# Patient Record
Sex: FEMALE | Race: WHITE | NOT HISPANIC OR LATINO | URBAN - METROPOLITAN AREA
[De-identification: names, ages, dates, MRNs, and addresses within clinical notes are randomized per-mention and may not be internally consistent; named-entity substitution may affect disease eponyms.]

---

## 2023-05-11 NOTE — PATIENT INSTRUCTIONS
Please message us via My Chart with any questions or concerns.      Weather related headaches:  -Consider cyproheptadine 4 mg quarter of a pill or half a pill at bedtime as needed. May increase up as tolerated.   - Patient may consider take Benadryl liquid, about 5 mL (12.5 mg) or 2.5 mL (6.25 mg) may increase to higher dose as tolerated, to see if this helps with weather related headaches.  She is to take the Benadryl at nighttime before going to bed.  Only on the days when she noticed significant change in the barometric pressure.  Chronic migraine headaches without aura  Migraine headache with aura:  Preventive therapy for headaches:   - Continue Magnesium Oxide 400mg or magnesium glycinate 400 one at bedtime.  If any diarrhea or upset stomach, decrease dose as tolerated  - Vitamin B 2 (riboflavin) 400 mg once a day. Two minor adverse reactions reported: diarrhea and increased urine frequency.  May cause the urine to turn yellow which is normal for B 2 to do and is not a sign that you are dehydrated. May order on line, as it comes in 400 mg capsules.  - Continue Topamax  - Has been getting Botox her last Botox was due on May 8.  We will resubmit for Botox to start treatment here.  - Qulipta 60 mg at bedtime nightly  - Also consider verapamil 40 mg twice a day, on the weeks the patient has more headaches.  Abortive therapy for headaches:   - At the onset of headache: Ubrelvy 100 mg  If that fails-in 2 to 3 hours  - Next step: Take sucralfate 1 g half an hour before taking indomethacin 50 mg.  Or she can eat something and then take indomethacin.  If that fails- if that fails in 2 to 3 hours  -Next step: May repeat Ubrelvy 100 mg    Headache management instructions  - When patient has a moderate to severe headache, they should seek rest, initiate relaxation and apply cold compresses to the head.   - Maintain regular sleep schedule. Adults need at least 7-8 hours of uninterrupted sleep, per night.   - Limit over  "the counter medications such as Tylenol, Ibuprofen, Aleve, Excedrin. (No more than 2- 3 times a week or max 10 a month).  - Maintain headache diary.  Free AYUSH for a smart phone, which can be used is \"Migraine gerardo\"  - Limit caffeine to 1-2 cups, 8 to 16 oz a day or less.  - Avoid dietary trigger. (aged cheese, peanuts, MSG, aspartame and nitrates).  - Patient is to have regular frequent meals to prevent headache onset.    - Please drink at least 64 ounces of water a day, to help remain hydrated.    Cognitive behavioral therapy (CBT):  - Is a common type of talk therapy (psychotherapy) were you work with a psychotherapist or therapist . CBT helps you become aware of inaccurate or negative thinking so you can view challenging situations more clearly and respond to them in a more effective way. CBT can be a very helpful tool either alone or in combination with other therapies in treating mental health disorders and chronic pain. CBT can be an effective tool to help anyone learn how to better manage stressful life situations and pain. In some cases, CBT is most effective when it's combined with other treatments, such as antidepressants or other medications.  You can start yourself by down loading the ayush: Curable    Here are a few websites on which you can find certified cognitive behavioral therapists:  Academyofct.org (Academy of cognitive therapists)  Adaa.org (Anxiety and depression association of [place])  Abct.org (associated for behavioral and cognitive therapy)    Mindfulness/Meditation:  - Many people believe that stress is a major trigger for their pain. This is where mindfulness and meditation can come into play, as they have been known to help reduce migraine severity, duration and acute pain medication use. It may also help to relieve stress and anxiety while improving feelings of well being.    Biofeedback:   - Involves becoming more aware of the changes that occur in the body and learning how to exert " control over generally involuntary functions. Biofeedback allows you to see your vitals in real-time and learn how to stabilize them on your own. There is great evidence that biofeedback can reduce the frequency, intensity, and duration of pain.   When you're stressed, you may notice elevated heart rates, tightened muscles, and sweating.  During biofeedback, you can see these changes on a monitor, then a therapist teaches you exercises to help manage these changes.    Yoga/Vick Chi:  - The kind of mind/body therapy that yoga can provide may help create relief from pain. Keeping up with yoga consistently can reduce headache frequency, intensity and duration, so it's important to practice regularly if you plan to use it as a complementary migraine treatment. However, certain types of yoga such as “ hot yoga”   may be uncomfortable for people with migraine. Others, such as “ restorative yoga,” may be tolerable even for a patient with chronic migraine.  Vick Chi can also have a similar benefit for patients with migraine. Specifically, it can help improve balance, which can be very useful for those with vestibular symptoms or vestibular migraine.    Acupuncture:  - A traditional Chinese medicine, acupuncture is reported to increase the release of serotonin, dopamine and other chemicals that may help to treat chronic pain, and can be helpful in preventing episodic migraine. There are, however, conflicting results on studies in acupuncture as a treatment for migraine.    Exercising:    - Regular exercise can reduce the frequency and intensity pain. When one exercises, the body releases endorphins, which are the body's natural painkillers. Exercise reduces stress and helps individuals to sleep at night. Exercising at least 30 to 40 minutes 3 times a week is sufficient for most patients.   When exercising, follow this plan:  - First, stay hydrated before, during, and after exercise.    - Second part of the exercise plan is to  eat sufficient food about an hour and a half before you exercise. Exercise causes one's blood sugar level to decrease, and it is important to have a source of energy.   - Final part of the exercise plan is to warm-up. Do not jump into sudden, vigorous exercise if that triggers a headache or migraine.       To read more go to: https://americanmigrainefoundation.org/resource-library/effects-of-exercise-on-headaches-and-migraines

## 2023-05-11 NOTE — PROGRESS NOTES
[facility]   Headache Center  Emilia Watkins MD  [address] ([address])  [place] [address]       Patient ID: Kiesha Vega    : 1970  MRN: [id number]                                            Visit Date: 2023  Encounter Provider: Emilia Watkins  Referring Provider: No ref. provider found           Assessment/Plan   Problem List Items Addressed This Visit        Other    Chronic migraine without aura with status migrainosus, not intractable - Primary    Relevant Medications    ibuprofen (MOTRIN) 600 mg tablet    atogepant (QULIPTA) 60 mg tablet    topiramate (TOPAMAX) 50 mg tablet    UBRELVY 100 mg tablet tablet    indomethacin (INDOCIN) 50 mg capsule    verapamiL (CALAN) 40 mg tablet    Other Relevant Orders    MRI BRAIN WITH AND WITHOUT CONTRAST    Hemicrania continua    Relevant Medications    ibuprofen (MOTRIN) 600 mg tablet    atogepant (QULIPTA) 60 mg tablet    topiramate (TOPAMAX) 50 mg tablet    UBRELVY 100 mg tablet tablet    indomethacin (INDOCIN) 50 mg capsule    verapamiL (CALAN) 40 mg tablet    Migraine with aura    Relevant Medications    ibuprofen (MOTRIN) 600 mg tablet    atogepant (QULIPTA) 60 mg tablet    topiramate (TOPAMAX) 50 mg tablet    UBRELVY 100 mg tablet tablet    indomethacin (INDOCIN) 50 mg capsule    verapamiL (CALAN) 40 mg tablet    Other Relevant Orders    MRI BRAIN WITH AND WITHOUT CONTRAST   Other Visit Diagnoses     Vitamin D deficiency        Relevant Orders    Vitamin D 25 hydroxy    Vitamin B12 deficiency        Relevant Orders    Vitamin B12          Please message us via My Chart with any questions or concerns.      Weather related headaches:  -Consider cyproheptadine 4 mg quarter of a pill or half a pill at bedtime as needed. May increase up as tolerated.   - Patient may consider take Benadryl liquid, about 5 mL (12.5 mg) or 2.5 mL (6.25 mg) may increase to higher dose as tolerated, to see if this helps with weather  "related headaches.  She is to take the Benadryl at nighttime before going to bed.  Only on the days when she noticed significant change in the barometric pressure.  Chronic migraine headaches without aura  Paroxysmal hemicrania continua  Migraine headache with aura:  Preventive therapy for headaches:   - Continue Magnesium Oxide 400mg or magnesium glycinate 400 one at bedtime.  If any diarrhea or upset stomach, decrease dose as tolerated  - Vitamin B 2 (riboflavin) 400 mg once a day. Two minor adverse reactions reported: diarrhea and increased urine frequency.  May cause the urine to turn yellow which is normal for B 2 to do and is not a sign that you are dehydrated. May order on line, as it comes in 400 mg capsules.  - Continue Topamax  - Has been getting Botox her last Botox was due on May 8.  We will resubmit for Botox to start treatment here.  - Qulipta 60 mg at bedtime nightly  - Also consider verapamil 40 mg twice a day, on the weeks the patient has more headaches.  Abortive therapy for headaches:   - At the onset of headache: Ubrelvy 100 mg  If that fails-in 2 to 3 hours  - Next step: Take sucralfate 1 g half an hour before taking indomethacin 50 mg.  Or she can eat something and then take indomethacin.  If that fails- if that fails in 2 to 3 hours  -Next step: May repeat Ubrelvy 100 mg    Headache management instructions  - When patient has a moderate to severe headache, they should seek rest, initiate relaxation and apply cold compresses to the head.   - Maintain regular sleep schedule. Adults need at least 7-8 hours of uninterrupted sleep, per night.   - Limit over the counter medications such as Tylenol, Ibuprofen, Aleve, Excedrin. (No more than 2- 3 times a week or max 10 a month).  - Maintain headache diary.  Free VENTURA for a smart phone, which can be used is \"Migraine gerardo\"  - Limit caffeine to 1-2 cups, 8 to 16 oz a day or less.  - Avoid dietary trigger. (aged cheese, peanuts, MSG, aspartame and " nitrates).  - Patient is to have regular frequent meals to prevent headache onset.    - Please drink at least 64 ounces of water a day, to help remain hydrated.        Return in about 3 months (around 8/12/2023).         _________________________________________________________________      Chief Complaint: Headache      Subjective     We had the pleasure of evaluating Kiesha Vega in neurological consultation today. As you know she  is a 52 y.o.  years old  right handed female.  she is here today for evaluation of headaches.  Work history: Some college, senior   Personal history: Single with 1 child - 27 years of age    Medical history review:   QTC: with her PCP within the last 1 year  Tobacco use: None  Vaping: None  Alcohol use: Socially  Daily Caffeine intake?  None  Daily water intake?  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  Yes, sister, niece, son with migraines,  Family history of aneurysms?  No  Family history of any other neurological disease?  Maternal grandfather with Alzheimer's disease, paternal grandmother with history of stroke,    Have you seen someone else for headaches or pain?  Yes [place]    Headaches started at what age?  Since the age of 15    What is your current pain level?   Headache:3/10  Neck pain: 3/10    How often do the headaches/pain occur?   Migraine headache: 1 to 4 a week depending on triggers  Neck pain/soreness: associated with headaches    Are you ever headache free?  yes    What is the intensity of pain?   Migraine headache: at th onset of her migraine at pain intensity 3-4/10     How long do the headaches/pain last?   Migraine headache: 2 hours to 24 hour if abortive fails. Sometimes  has to take a second Ubrelvy    Neck pain:as long as the headache last    What time of the day do headaches/pain start?  Migraine headache:anytime of they or can wake up with it or in the middle of the night    Describe your usual headache/pain?   Migraine headache: aching, tight band, pounding throbbing,stabbing, sharp,    Where is your headache/pain located?   Migraine headache:locked on the right side of her had, 90% of the time, when it is left sided it is severe  Neck pain:right sided    Aura/Warning/Prodrome?   Aura onset at age:23 year    Loss of peripheral vision:   Duration: 1 hours  Frequency: uses to be with each migraine in the past but has not had this for many years now    Loss of vision: right eyes.  States she had complete loss of vision, blackout, in 2011  Duration: one hours  Frequency: one event    Black Squiggly line and flashing light in the periphery:   Duration: 5 minutes to one hour  Frequency: This tends to occur with each migraine headache now    Associated symptoms with headache or neck pain:   - Decrease appetite, Nausea, Vomiting x 1   - Photophobia, Phonophobia, Osmophobia   - Flushing of face  - Stiff or sore neck  - Problems with concentration   - Blurred vision   - Prefer to be in a cool, quiet, dark room     Triggers: Stress, tension, weather change, lack of sleep, any type of alcohol, dehydration Food (yogurt, aged cheese, aspartame, nitrates, gluten), caffeine     Previous/current treatments for headaches/pain/mood:   CBD or THC: NO   Interventional procedure: NO   Alternative therapies: massage  Headache devices: Nerivio , Cefaly, Gamma cor, Tens units-none  Trigger point injection/Nerve block:NO   Botulinum toxin: YES   Headache infusions:NO   Preventive medication:   - Magnesium 400 mg, vitamin B2 400 mg, turmeric, multivitamin,  - Zoloft (itching/jaw clenching)  - Xanax 0.25 mg, diazepam 5 mg  - Topamax 100 mg twice daily,  - Propanolol 80 mg (2019), verapamil 40 mg twice  daily ()  - Flexeril 5 mg  Abortive medication:   - Dilaudid (nightmares), Percocet (pruritus), Vicodin (emesis), Demerol  - Fioricet,  - Axert, sumatriptan  - Ubrelvy 100 mg, Nurtec 75 mg  - Tylenol 500, Excedrin,  - Naproxen 500 (pruritus), ibuprofen 600 mg, indomethacin 50 mg,  - Prednisone (tachycardia)  - Compazine,    - I personally reviewed old notes over the last few months     Imaging/work-up:  EM-Preliminary results: (1) there is mild chronic denervation in right ulnar hand muscles c/w a mild prior injury to the ulnar nerve, (2) the right ulnar sensory and motor response amplitudes are normal, (3) there is no electrophysiologic evidence of compressive neuropathy at the elbow or wrist at this time, (4) no other abnormalities were seen in the RUE.    Medications:   Current Outpatient Medications:   •  atogepant (QULIPTA) 60 mg tablet, Take 1 tablet by mouth daily., Disp: 90 tablet, Rfl: 3  •  busPIRone (BUSPAR) 5 mg tablet, , Disp: , Rfl:   •  ibuprofen (MOTRIN) 600 mg tablet, 600 mg., Disp: , Rfl:   •  indomethacin (INDOCIN) 50 mg capsule, 1 capsule at the onset of headache with food, 3 times a day as needed, Disp: 15 capsule, Rfl: 0  •  metroNIDAZOLE (METROCREAM) 0.75 % cream, APPLY 1-2 TIMES DAILY TO AFFECTED AREAS ON FACE., Disp: , Rfl:   •  topiramate (TOPAMAX) 50 mg tablet, 2 in the morning and 2 at bedtime, Disp: 120 tablet, Rfl: 6  •  UBRELVY 100 mg tablet tablet, Take 1 tablet (100 mg total) by mouth as needed for migraine. May repeat in 2 hours if needed, Disp: 8 tablet, Rfl: 6  •  verapamiL (CALAN) 40 mg tablet, 1 tab twice a day,as needed, Disp: 60 tablet, Rfl: 0  •  zolpidem (AMBIEN) 5 mg tablet, TAKE 1 TABLET EVERY DAY BY ORAL ROUTE AS NEEDED., Disp: , Rfl:     Past Medical History:  has a past medical history of Headache.    Past Surgical History:  has a past surgical history that includes Knee surgery; Cholecystectomy; and  section.    Social History:   Social History  "    Tobacco Use   • Smoking status: Never   • Smokeless tobacco: Never   Substance Use Topics   • Alcohol use: Yes       Family History: family history includes Breast cancer in her biological sister; Diabetes in her biological father, biological mother, and biological sister; Hyperlipidemia in her biological father; Hypertension in her biological mother; Prostate cancer in her biological father.    Allergies: is allergic to penicillins, sertraline, hydrocodone-acetaminophen, hydromorphone, naproxen, oxycodone-acetaminophen, potassium clavulanate, and prednisone.     Review of Systems  All other systems reviewed and negative except as noted in the HPI.      The following have been reviewed and updated as appropriate in this visit:   Allergies  Meds  Problems         Objective   Physical Exam:    Visit Vitals  /68 (BP Location: Left upper arm, Patient Position: Sitting)   Pulse 66   Temp 36.7 °C (98 °F)   Resp 16   Ht 1.727 m (5' 8\")   Wt 72.1 kg (159 lb)   SpO2 99%   BMI 24.18 kg/m²         Musculoskeletal: - Range of motion - WNL     Behavior/Emotional: Appropriate, cooperative     Neurologic Exam:  Alert and oriented.       CN: Intact    Motor: Moving all extremities without any difficulty.    Coordination: No tremor noted      Gait: Was narrow based.             Emilia Watkins MD    I spent 70 minutes on this date of service performing the following activities: obtaining history, performing examination, entering orders, documenting, preparing for visit, obtaining / reviewing records, providing counseling and education and independently reviewing study/studies.   "

## 2023-05-12 ENCOUNTER — OFFICE VISIT (OUTPATIENT)
Dept: NEUROLOGY | Facility: CLINIC | Age: 53
End: 2023-05-12
Payer: COMMERCIAL

## 2023-05-12 VITALS
WEIGHT: 159 LBS | HEIGHT: 68 IN | DIASTOLIC BLOOD PRESSURE: 68 MMHG | SYSTOLIC BLOOD PRESSURE: 104 MMHG | OXYGEN SATURATION: 99 % | TEMPERATURE: 98 F | RESPIRATION RATE: 16 BRPM | HEART RATE: 66 BPM | BODY MASS INDEX: 24.1 KG/M2

## 2023-05-12 DIAGNOSIS — G43.109 MIGRAINE WITH AURA AND WITHOUT STATUS MIGRAINOSUS, NOT INTRACTABLE: ICD-10-CM

## 2023-05-12 DIAGNOSIS — E55.9 VITAMIN D DEFICIENCY: ICD-10-CM

## 2023-05-12 DIAGNOSIS — G43.701 CHRONIC MIGRAINE WITHOUT AURA WITH STATUS MIGRAINOSUS, NOT INTRACTABLE: Primary | ICD-10-CM

## 2023-05-12 DIAGNOSIS — G44.51 HEMICRANIA CONTINUA: ICD-10-CM

## 2023-05-12 DIAGNOSIS — E53.8 VITAMIN B12 DEFICIENCY: ICD-10-CM

## 2023-05-12 PROBLEM — M24.562 CONTRACTURE OF LEFT KNEE: Status: ACTIVE | Noted: 2017-10-04

## 2023-05-12 PROBLEM — N60.01 BREAST CYST, RIGHT: Status: ACTIVE | Noted: 2022-12-06

## 2023-05-12 PROCEDURE — 99205 OFFICE O/P NEW HI 60 MIN: CPT | Performed by: PSYCHIATRY & NEUROLOGY

## 2023-05-12 PROCEDURE — 3008F BODY MASS INDEX DOCD: CPT | Performed by: PSYCHIATRY & NEUROLOGY

## 2023-05-12 RX ORDER — UBROGEPANT 100 MG/1
TABLET ORAL
COMMUNITY
Start: 2023-05-11 | End: 2023-05-12 | Stop reason: SDUPTHER

## 2023-05-12 RX ORDER — TOPIRAMATE 50 MG/1
TABLET, FILM COATED ORAL
COMMUNITY
Start: 2023-04-29 | End: 2023-05-12 | Stop reason: SDUPTHER

## 2023-05-12 RX ORDER — METRONIDAZOLE 7.5 MG/G
CREAM TOPICAL
COMMUNITY
Start: 2023-04-11

## 2023-05-12 RX ORDER — ATOGEPANT 60 MG/1
1 TABLET ORAL DAILY
COMMUNITY
Start: 2023-04-15 | End: 2023-05-12 | Stop reason: SDUPTHER

## 2023-05-12 RX ORDER — UBROGEPANT 100 MG/1
100 TABLET ORAL AS NEEDED
Qty: 8 TABLET | Refills: 6 | Status: SHIPPED | OUTPATIENT
Start: 2023-05-12 | End: 2024-01-09

## 2023-05-12 RX ORDER — ZOLPIDEM TARTRATE 5 MG/1
TABLET ORAL
COMMUNITY
Start: 2023-04-14

## 2023-05-12 RX ORDER — INDOMETHACIN 50 MG/1
CAPSULE ORAL
Qty: 15 CAPSULE | Refills: 0 | Status: SHIPPED | OUTPATIENT
Start: 2023-05-12 | End: 2023-07-11 | Stop reason: SDUPTHER

## 2023-05-12 RX ORDER — LANOLIN ALCOHOL/MO/W.PET/CERES
400 CREAM (GRAM) TOPICAL DAILY
Qty: 90 TABLET | Refills: 1 | Status: CANCELLED | OUTPATIENT
Start: 2023-05-12 | End: 2023-11-08

## 2023-05-12 RX ORDER — VERAPAMIL HYDROCHLORIDE 40 MG/1
TABLET ORAL
Qty: 60 TABLET | Refills: 0 | Status: SHIPPED | OUTPATIENT
Start: 2023-05-12 | End: 2023-06-09

## 2023-05-12 RX ORDER — ATOGEPANT 60 MG/1
1 TABLET ORAL DAILY
Qty: 90 TABLET | Refills: 3 | Status: SHIPPED | OUTPATIENT
Start: 2023-05-12 | End: 2024-05-28

## 2023-05-12 RX ORDER — IBUPROFEN 600 MG/1
600 TABLET ORAL
COMMUNITY

## 2023-05-12 RX ORDER — BUSPIRONE HYDROCHLORIDE 5 MG/1
TABLET ORAL
COMMUNITY
Start: 2023-01-31

## 2023-05-12 RX ORDER — TOPIRAMATE 50 MG/1
TABLET, FILM COATED ORAL
Qty: 120 TABLET | Refills: 6
Start: 2023-05-12 | End: 2023-11-15 | Stop reason: SDUPTHER

## 2023-05-12 RX ORDER — RIBOFLAVIN (VITAMIN B2) 100 MG
TABLET ORAL
Qty: 120 TABLET | Refills: 3 | Status: CANCELLED | OUTPATIENT
Start: 2023-05-12

## 2023-06-01 ENCOUNTER — HOSPITAL ENCOUNTER (OUTPATIENT)
Dept: RADIOLOGY | Facility: HOSPITAL | Age: 53
Discharge: HOME | End: 2023-06-01
Attending: PSYCHIATRY & NEUROLOGY
Payer: COMMERCIAL

## 2023-06-01 DIAGNOSIS — G43.701 CHRONIC MIGRAINE WITHOUT AURA WITH STATUS MIGRAINOSUS, NOT INTRACTABLE: ICD-10-CM

## 2023-06-01 DIAGNOSIS — G43.109 MIGRAINE WITH AURA AND WITHOUT STATUS MIGRAINOSUS, NOT INTRACTABLE: ICD-10-CM

## 2023-06-01 PROCEDURE — A9585 GADOBUTROL INJECTION: HCPCS | Mod: JW | Performed by: PSYCHIATRY & NEUROLOGY

## 2023-06-01 PROCEDURE — 70553 MRI BRAIN STEM W/O & W/DYE: CPT | Mod: ME

## 2023-06-01 RX ORDER — GADOBUTROL 604.72 MG/ML
0.1 INJECTION INTRAVENOUS ONCE
Status: COMPLETED | OUTPATIENT
Start: 2023-06-01 | End: 2023-06-01

## 2023-06-01 RX ADMIN — GADOBUTROL 7.3 MMOL: 604.72 INJECTION INTRAVENOUS at 17:38

## 2023-06-09 DIAGNOSIS — G44.51 HEMICRANIA CONTINUA: ICD-10-CM

## 2023-06-09 RX ORDER — VERAPAMIL HYDROCHLORIDE 40 MG/1
TABLET ORAL
Qty: 60 TABLET | Refills: 0 | Status: SHIPPED | OUTPATIENT
Start: 2023-06-09

## 2023-06-21 ENCOUNTER — OFFICE VISIT (OUTPATIENT)
Dept: NEUROLOGY | Facility: CLINIC | Age: 53
End: 2023-06-21
Attending: PSYCHIATRY & NEUROLOGY
Payer: COMMERCIAL

## 2023-06-21 VITALS
OXYGEN SATURATION: 98 % | DIASTOLIC BLOOD PRESSURE: 76 MMHG | RESPIRATION RATE: 16 BRPM | SYSTOLIC BLOOD PRESSURE: 102 MMHG | HEART RATE: 82 BPM | TEMPERATURE: 97.6 F

## 2023-06-21 DIAGNOSIS — G43.701 CHRONIC MIGRAINE WITHOUT AURA WITH STATUS MIGRAINOSUS, NOT INTRACTABLE: ICD-10-CM

## 2023-06-21 PROCEDURE — 64615 CHEMODENERV MUSC MIGRAINE: CPT | Performed by: NURSE PRACTITIONER

## 2023-06-21 PROCEDURE — 99999 PR OFFICE/OUTPT VISIT,PROCEDURE ONLY: CPT | Performed by: NURSE PRACTITIONER

## 2023-06-21 RX ORDER — ONABOTULINUMTOXINA 200 [USP'U]/1
INJECTION, POWDER, LYOPHILIZED, FOR SOLUTION INTRADERMAL; INTRAMUSCULAR
COMMUNITY
Start: 2023-05-18

## 2023-06-21 RX ORDER — ONABOTULINUMTOXINA 200 [USP'U]/1
INJECTION, POWDER, LYOPHILIZED, FOR SOLUTION INTRADERMAL; INTRAMUSCULAR
COMMUNITY
Start: 2022-08-08

## 2023-06-21 NOTE — PROGRESS NOTES
Patient is first time (in this office ) for Botox injections.   She has been previously getting treatments with [facility].      Botox Injections   Botox lot number : [id number]  Expiration Date : 02/2026    NDC# 8254-9120-79     Patient informed and consent was obtained .   4 cc of Normal saline were added to one vial of Botox Type A resulting in 200 Units of Botox . After the patient consented to the procedure the following muscles were injected after cleaning the overlying skin with alcohol.  New FDA mandated warnings provided to the patient with instructions to seek medical attention for difficulty swallowing or breathing .     Frontalis 10 units right and left (in 2 areas each side )    / Glabellar 5 units right and left   Procerus 5 units  Temporalis 20 units right and left (in 4 areas each side )  Trapezius 15 units right and left (in 3 areas each side )    Cervical Paraspinals 10 units right and left (in 2 areas each side - the second injection is lateral ) across the base of the skull     Occipitalis 15 units right and left  (in 3 areas each side )      A total of 155 units was used and 45 units wasted.     The patient tolerated the procedure well.     Diagnoses and all orders for this visit:    Chronic migraine without aura with status migrainosus, not intractable  -     [facility] Botulinum Toxin Injection Appointment Request  -     onabotulinumtoxinA (BOTOX) 200 unit injection 200 Units

## 2023-11-15 DIAGNOSIS — G43.109 MIGRAINE WITH AURA AND WITHOUT STATUS MIGRAINOSUS, NOT INTRACTABLE: ICD-10-CM

## 2023-11-15 DIAGNOSIS — G43.701 CHRONIC MIGRAINE WITHOUT AURA WITH STATUS MIGRAINOSUS, NOT INTRACTABLE: ICD-10-CM

## 2023-11-15 RX ORDER — TOPIRAMATE 50 MG/1
TABLET, FILM COATED ORAL
Qty: 120 TABLET | Refills: 6 | Status: SHIPPED | OUTPATIENT
Start: 2023-11-15 | End: 2024-06-05

## 2024-01-08 DIAGNOSIS — G43.701 CHRONIC MIGRAINE WITHOUT AURA WITH STATUS MIGRAINOSUS, NOT INTRACTABLE: ICD-10-CM

## 2024-01-08 DIAGNOSIS — G43.109 MIGRAINE WITH AURA AND WITHOUT STATUS MIGRAINOSUS, NOT INTRACTABLE: ICD-10-CM

## 2024-01-09 RX ORDER — UBROGEPANT 100 MG/1
100 TABLET ORAL AS NEEDED
Qty: 8 TABLET | Refills: 6 | Status: SHIPPED | OUTPATIENT
Start: 2024-01-09 | End: 2024-08-12

## 2024-02-20 DIAGNOSIS — G44.51 HEMICRANIA CONTINUA: ICD-10-CM

## 2024-02-20 RX ORDER — INDOMETHACIN 50 MG/1
CAPSULE ORAL
Qty: 15 CAPSULE | Refills: 6 | Status: SHIPPED | OUTPATIENT
Start: 2024-02-20

## 2024-05-28 DIAGNOSIS — G43.109 MIGRAINE WITH AURA AND WITHOUT STATUS MIGRAINOSUS, NOT INTRACTABLE: ICD-10-CM

## 2024-05-28 DIAGNOSIS — G43.701 CHRONIC MIGRAINE WITHOUT AURA WITH STATUS MIGRAINOSUS, NOT INTRACTABLE: ICD-10-CM

## 2024-05-28 RX ORDER — ATOGEPANT 60 MG/1
1 TABLET ORAL DAILY
Qty: 90 TABLET | Refills: 0 | Status: SHIPPED | OUTPATIENT
Start: 2024-05-28 | End: 2024-09-03

## 2024-06-05 DIAGNOSIS — G43.701 CHRONIC MIGRAINE WITHOUT AURA WITH STATUS MIGRAINOSUS, NOT INTRACTABLE: ICD-10-CM

## 2024-06-05 DIAGNOSIS — G43.109 MIGRAINE WITH AURA AND WITHOUT STATUS MIGRAINOSUS, NOT INTRACTABLE: ICD-10-CM

## 2024-06-05 RX ORDER — TOPIRAMATE 50 MG/1
TABLET, FILM COATED ORAL
Qty: 120 TABLET | Refills: 3 | Status: SHIPPED | OUTPATIENT
Start: 2024-06-05 | End: 2024-07-03

## 2024-07-03 DIAGNOSIS — G43.109 MIGRAINE WITH AURA AND WITHOUT STATUS MIGRAINOSUS, NOT INTRACTABLE: ICD-10-CM

## 2024-07-03 DIAGNOSIS — G43.701 CHRONIC MIGRAINE WITHOUT AURA WITH STATUS MIGRAINOSUS, NOT INTRACTABLE: ICD-10-CM

## 2024-07-03 RX ORDER — TOPIRAMATE 50 MG/1
TABLET, FILM COATED ORAL
Qty: 360 TABLET | Refills: 1 | Status: SHIPPED | OUTPATIENT
Start: 2024-07-03

## 2024-08-10 DIAGNOSIS — G43.109 MIGRAINE WITH AURA AND WITHOUT STATUS MIGRAINOSUS, NOT INTRACTABLE: ICD-10-CM

## 2024-08-10 DIAGNOSIS — G43.701 CHRONIC MIGRAINE WITHOUT AURA WITH STATUS MIGRAINOSUS, NOT INTRACTABLE: ICD-10-CM

## 2024-08-12 RX ORDER — UBROGEPANT 100 MG/1
100 TABLET ORAL AS NEEDED
Qty: 8 TABLET | Refills: 2 | Status: SHIPPED | OUTPATIENT
Start: 2024-08-12

## 2024-09-01 DIAGNOSIS — G43.701 CHRONIC MIGRAINE WITHOUT AURA WITH STATUS MIGRAINOSUS, NOT INTRACTABLE: ICD-10-CM

## 2024-09-01 DIAGNOSIS — G43.109 MIGRAINE WITH AURA AND WITHOUT STATUS MIGRAINOSUS, NOT INTRACTABLE: ICD-10-CM

## 2024-09-03 RX ORDER — ATOGEPANT 60 MG/1
1 TABLET ORAL DAILY
Qty: 30 TABLET | Refills: 0 | Status: SHIPPED | OUTPATIENT
Start: 2024-09-03 | End: 2024-10-04

## 2024-10-04 DIAGNOSIS — G43.109 MIGRAINE WITH AURA AND WITHOUT STATUS MIGRAINOSUS, NOT INTRACTABLE: ICD-10-CM

## 2024-10-04 DIAGNOSIS — G43.701 CHRONIC MIGRAINE WITHOUT AURA WITH STATUS MIGRAINOSUS, NOT INTRACTABLE: ICD-10-CM

## 2024-10-04 RX ORDER — ATOGEPANT 60 MG/1
1 TABLET ORAL DAILY
Qty: 30 TABLET | Refills: 0 | Status: SHIPPED | OUTPATIENT
Start: 2024-10-04

## 2024-12-05 ENCOUNTER — APPOINTMENT (OUTPATIENT)
Dept: URBAN - METROPOLITAN AREA CLINIC 203 | Age: 54
Setting detail: DERMATOLOGY
End: 2024-12-05

## 2024-12-05 DIAGNOSIS — L64.8 OTHER ANDROGENIC ALOPECIA: ICD-10-CM

## 2024-12-05 DIAGNOSIS — L82.1 OTHER SEBORRHEIC KERATOSIS: ICD-10-CM

## 2024-12-05 DIAGNOSIS — L66.12 FRONTAL FIBROSING ALOPECIA: ICD-10-CM

## 2024-12-05 PROCEDURE — OTHER PRESCRIPTION MEDICATION MANAGEMENT: OTHER

## 2024-12-05 PROCEDURE — 99204 OFFICE O/P NEW MOD 45 MIN: CPT

## 2024-12-05 PROCEDURE — OTHER PRESCRIPTION: OTHER

## 2024-12-05 PROCEDURE — OTHER COUNSELING: OTHER

## 2024-12-05 RX ORDER — MINOXIDIL 2.5 MG/1
TABLET ORAL QD
Qty: 90 | Refills: 3 | Status: ERX | COMMUNITY
Start: 2024-12-05

## 2024-12-05 ASSESSMENT — LOCATION DETAILED DESCRIPTION DERM
LOCATION DETAILED: LEFT SUPERIOR FOREHEAD
LOCATION DETAILED: RIGHT SUPERIOR FOREHEAD
LOCATION DETAILED: RIGHT INFERIOR CENTRAL MALAR CHEEK

## 2024-12-05 ASSESSMENT — LOCATION SIMPLE DESCRIPTION DERM
LOCATION SIMPLE: RIGHT CHEEK
LOCATION SIMPLE: LEFT FOREHEAD
LOCATION SIMPLE: RIGHT FOREHEAD

## 2024-12-05 ASSESSMENT — SEVERITY OF ALOPECIA TOOL: % SCALP HAIR LOST: 50

## 2024-12-05 ASSESSMENT — LOCATION ZONE DERM: LOCATION ZONE: FACE

## 2024-12-05 ASSESSMENT — PAIN INTENSITY VAS: HOW INTENSE IS YOUR PAIN 0 BEING NO PAIN, 10 BEING THE MOST SEVERE PAIN POSSIBLE?: NO PAIN

## 2024-12-05 NOTE — PROCEDURE: PRESCRIPTION MEDICATION MANAGEMENT
Render In Strict Bullet Format?: No
Initiate Treatment: Minoxidil 2.5mg po qd\\n\\n[place] compound topical tofacitinib 2% cream- Apply thin layer to hairline bid
Plan: Pt is to discontinue her topamax as discussed with her neurologist and start minoxidil 2.5mg po qd
Detail Level: Zone

## 2025-05-17 DIAGNOSIS — G43.109 MIGRAINE WITH AURA AND WITHOUT STATUS MIGRAINOSUS, NOT INTRACTABLE: ICD-10-CM

## 2025-05-17 DIAGNOSIS — G43.701 CHRONIC MIGRAINE WITHOUT AURA WITH STATUS MIGRAINOSUS, NOT INTRACTABLE: ICD-10-CM

## 2025-05-19 RX ORDER — TOPIRAMATE 50 MG/1
TABLET, FILM COATED ORAL
Qty: 120 TABLET | Refills: 0 | Status: SHIPPED | OUTPATIENT
Start: 2025-05-19

## 2025-06-05 ENCOUNTER — APPOINTMENT (OUTPATIENT)
Dept: URBAN - METROPOLITAN AREA CLINIC 203 | Age: 55
Setting detail: DERMATOLOGY
End: 2025-06-05

## 2025-06-05 DIAGNOSIS — L64.8 OTHER ANDROGENIC ALOPECIA: ICD-10-CM

## 2025-06-05 DIAGNOSIS — L66.12 FRONTAL FIBROSING ALOPECIA: ICD-10-CM

## 2025-06-05 DIAGNOSIS — H00.01 HORDEOLUM EXTERNUM: ICD-10-CM

## 2025-06-05 PROBLEM — H00.014 HORDEOLUM EXTERNUM LEFT UPPER EYELID: Status: ACTIVE | Noted: 2025-06-05

## 2025-06-05 PROCEDURE — OTHER PRESCRIPTION MEDICATION MANAGEMENT: OTHER

## 2025-06-05 PROCEDURE — 99214 OFFICE O/P EST MOD 30 MIN: CPT

## 2025-06-05 PROCEDURE — OTHER COUNSELING: OTHER

## 2025-06-05 PROCEDURE — OTHER PRESCRIPTION: OTHER

## 2025-06-05 RX ORDER — MINOXIDIL 2.5 MG/1
TABLET ORAL QD
Qty: 90 | Refills: 3 | Status: ACTIVE

## 2025-06-05 ASSESSMENT — LOCATION DETAILED DESCRIPTION DERM
LOCATION DETAILED: LEFT LATERAL SUPERIOR EYELID
LOCATION DETAILED: RIGHT SUPERIOR FOREHEAD
LOCATION DETAILED: LEFT SUPERIOR FOREHEAD

## 2025-06-05 ASSESSMENT — LOCATION SIMPLE DESCRIPTION DERM
LOCATION SIMPLE: LEFT SUPERIOR EYELID
LOCATION SIMPLE: RIGHT FOREHEAD
LOCATION SIMPLE: LEFT FOREHEAD

## 2025-06-05 ASSESSMENT — LOCATION ZONE DERM
LOCATION ZONE: FACE
LOCATION ZONE: EYELID

## 2025-09-05 ENCOUNTER — HOSPITAL ENCOUNTER (OUTPATIENT)
Dept: RADIOLOGY | Facility: HOSPITAL | Age: 55
Discharge: HOME | End: 2025-09-05
Payer: COMMERCIAL

## 2025-09-05 DIAGNOSIS — R10.12 ABDOMINAL PAIN, LEFT UPPER QUADRANT: ICD-10-CM

## 2025-09-05 DIAGNOSIS — R63.4 LOSS OF WEIGHT: ICD-10-CM

## 2025-09-05 DIAGNOSIS — R10.32 ABDOMINAL PAIN, LEFT LOWER QUADRANT: ICD-10-CM

## 2025-09-05 DIAGNOSIS — R68.81 EARLY SATIETY: ICD-10-CM

## 2025-09-05 PROCEDURE — 74178 CT ABD&PLV WO CNTR FLWD CNTR: CPT

## 2025-09-05 PROCEDURE — 63600105 HC IODINE BASED CONTRAST: Mod: JZ

## 2025-09-05 PROCEDURE — 25500000 HC DRUGS/INCIDENT RAD

## 2025-09-05 RX ORDER — IOPAMIDOL 755 MG/ML
100 INJECTION, SOLUTION INTRAVASCULAR
Status: COMPLETED | OUTPATIENT
Start: 2025-09-05 | End: 2025-09-05

## 2025-09-05 RX ADMIN — IOPAMIDOL 100 ML: 755 INJECTION, SOLUTION INTRAVENOUS at 16:10

## 2025-09-05 RX ADMIN — BARIUM SULFATE 900 ML: 20 SUSPENSION ORAL at 16:10
